# Patient Record
Sex: FEMALE | Race: WHITE | HISPANIC OR LATINO | Employment: PART TIME | URBAN - NONMETROPOLITAN AREA
[De-identification: names, ages, dates, MRNs, and addresses within clinical notes are randomized per-mention and may not be internally consistent; named-entity substitution may affect disease eponyms.]

---

## 2019-01-01 ENCOUNTER — NON-PROVIDER VISIT (OUTPATIENT)
Dept: URGENT CARE | Facility: PHYSICIAN GROUP | Age: 29
End: 2019-01-01

## 2019-01-01 DIAGNOSIS — Z11.1 ENCOUNTER FOR PPD TEST: ICD-10-CM

## 2019-01-01 PROCEDURE — 86580 TB INTRADERMAL TEST: CPT | Performed by: PHYSICIAN ASSISTANT

## 2019-01-01 NOTE — NON-PROVIDER
Georgia Nugent is a 28 y.o. female here for a non-provider visit for PPD placement -- Step 1 of 1    Reason for PPD:  work requirement    1. TB evaluation questionnaire completed by patient? Yes      -  If any answers marked yes did you contact a provider prior to placing? No  2.  Patient notified to return to clinic for reading on: 1/3/19 or 1/4/19  3.  PPD Placement documentation completed on TB evaluation questionnaire? Yes  4.  Location of TB evaluation questionnaire filed: [facility]

## 2019-01-03 ENCOUNTER — NON-PROVIDER VISIT (OUTPATIENT)
Dept: URGENT CARE | Facility: PHYSICIAN GROUP | Age: 29
End: 2019-01-03

## 2019-01-03 LAB — TB WHEAL 3D P 5 TU DIAM: NEGATIVE MM

## 2019-08-01 ENCOUNTER — NON-PROVIDER VISIT (OUTPATIENT)
Dept: URGENT CARE | Facility: PHYSICIAN GROUP | Age: 29
End: 2019-08-01

## 2019-08-01 DIAGNOSIS — Z02.1 PRE-EMPLOYMENT DRUG SCREENING: ICD-10-CM

## 2019-08-01 LAB
AMP AMPHETAMINE: NORMAL
COC COCAINE: NORMAL
INT CON NEG: NORMAL
INT CON POS: NORMAL
MET METHAMPHETAMINES: NORMAL
OPI OPIATES: NORMAL
PCP PHENCYCLIDINE: NORMAL
POC DRUG COMMENT 753798-OCCUPATIONAL HEALTH: NEGATIVE
THC: NORMAL

## 2019-08-01 PROCEDURE — 80305 DRUG TEST PRSMV DIR OPT OBS: CPT | Performed by: NURSE PRACTITIONER

## 2020-09-14 ENCOUNTER — OFFICE VISIT (OUTPATIENT)
Dept: URGENT CARE | Facility: PHYSICIAN GROUP | Age: 30
End: 2020-09-14
Payer: MEDICAID

## 2020-09-14 VITALS
HEART RATE: 89 BPM | OXYGEN SATURATION: 99 % | TEMPERATURE: 97.7 F | HEIGHT: 67 IN | RESPIRATION RATE: 16 BRPM | DIASTOLIC BLOOD PRESSURE: 92 MMHG | WEIGHT: 199 LBS | SYSTOLIC BLOOD PRESSURE: 138 MMHG | BODY MASS INDEX: 31.23 KG/M2

## 2020-09-14 DIAGNOSIS — H69.93 EUSTACHIAN TUBE DYSFUNCTION, BILATERAL: ICD-10-CM

## 2020-09-14 DIAGNOSIS — H92.02 OTALGIA OF LEFT EAR: ICD-10-CM

## 2020-09-14 PROCEDURE — 99203 OFFICE O/P NEW LOW 30 MIN: CPT | Performed by: PHYSICIAN ASSISTANT

## 2020-09-14 RX ORDER — ESCITALOPRAM OXALATE 10 MG/1
10 TABLET ORAL DAILY
COMMUNITY

## 2020-09-14 RX ORDER — ACETAMINOPHEN 500 MG
500-1000 TABLET ORAL EVERY 6 HOURS PRN
COMMUNITY
End: 2021-08-01

## 2020-09-14 ASSESSMENT — ENCOUNTER SYMPTOMS
FEVER: 0
HEADACHES: 1
SORE THROAT: 0
COUGH: 0
CHILLS: 0
SHORTNESS OF BREATH: 0
DIARRHEA: 0
ABDOMINAL PAIN: 0
PALPITATIONS: 0
EYE PAIN: 0
MYALGIAS: 0
BLURRED VISION: 0
VOMITING: 0
DIZZINESS: 1
NAUSEA: 0

## 2020-09-14 ASSESSMENT — PAIN SCALES - GENERAL: PAINLEVEL: 5=MODERATE PAIN

## 2020-09-15 NOTE — PROGRESS NOTES
Subjective:      Georgia Nugent is a 30 y.o. female who presents with Otalgia ((L) side, since friday )    HPI:  This is a new problem. Georgia Nugent is a 30 y.o. female who presents today for evaluation of left ear pain.  Patient states that she started to develop left ear pain around 9:30 PM on Friday night.  States it has been gradually improving but she has history of ear infections and has ruptured her eardrum in the past so she was concerned and wanted to come in for evaluation she has not noticed any drainage from the ear.  No recent swimming.  No pain with movement of the external ear.  No sore throat.  No tinnitus.  She notes she had some mild dizziness the first couple days but this has resolved.  She had bought some OTC meclizine to use for the dizziness but never needed to use it.  She has been taking OTC ibuprofen for the pain which has provided mild relief.      Review of Systems   Constitutional: Negative for chills, fever and malaise/fatigue.   HENT: Positive for ear pain. Negative for congestion and sore throat.    Eyes: Negative for blurred vision and pain.   Respiratory: Negative for cough and shortness of breath.    Cardiovascular: Negative for chest pain and palpitations.   Gastrointestinal: Negative for abdominal pain, diarrhea, nausea and vomiting.   Musculoskeletal: Negative for myalgias.   Neurological: Positive for dizziness and headaches.   Endo/Heme/Allergies: Positive for environmental allergies.       PMH:  has a past medical history of Anxiety, Arthritis, Depression, Psychiatric disorder, and Urinary tract infection, site not specified. She also has no past medical history of Addisons disease (formerly [facility]), Adrenal disorder (formerly [facility]), Allergy, Anemia, Arrhythmia, ASTHMA, Blood transfusion, Cancer (formerly [facility]), CATARACT, CHF (congestive heart failure) (formerly [facility]), Clotting disorder (formerly [facility]), COPD, Cushings syndrome (formerly [facility]), Diabetes, Diabetic neuropathy (formerly [facility]), EMPHYSEMA, GERD (gastroesophageal reflux disease),  "Glaucoma, Goiter, Headache(784.0), Heart attack (HCC), Heart murmur, HIV (human immunodeficiency virus infection), Hyperlipidemia, Hypertension, IBD (inflammatory bowel disease), Kidney disease, Meningitis, Migraine, Muscle disorder, OSTEOPOROSIS, Parathyroid disorder (HCC), Pituitary disease (HCC), Seizure (HCC), Sickle cell disease (HCC), Stroke (HCC), Substance abuse (HCC), Thyroid disease, Tuberculosis, or Ulcer.  MEDS:   Current Outpatient Medications:   •  escitalopram (LEXAPRO) 10 MG Tab, Take 10 mg by mouth every day., Disp: , Rfl:   •  acetaminophen (TYLENOL) 500 MG Tab, Take 500-1,000 mg by mouth every 6 hours as needed., Disp: , Rfl:   •  Prenatal MV-Min-Fe Fum-FA-DHA (PRENATAL 1 PO), Take  by mouth.  , Disp: , Rfl:   ALLERGIES: No Known Allergies  SURGHX:   Past Surgical History:   Procedure Laterality Date   • DENTAL EXTRACTION(S)  04/2012     SOCHX:  reports that she has quit smoking. Her smoking use included cigarettes. She has a 2.00 pack-year smoking history. She has never used smokeless tobacco. She reports current alcohol use. She reports that she does not use drugs.  FH: Family history was reviewed, no pertinent findings to report     Objective:     /92   Pulse 89   Temp 36.5 °C (97.7 °F) (Temporal)   Resp 16   Ht 1.702 m (5' 7\")   Wt 90.3 kg (199 lb)   SpO2 99%   BMI 31.17 kg/m²      Physical Exam  Constitutional:       Appearance: She is well-developed.   HENT:      Head: Normocephalic and atraumatic.      Right Ear: Ear canal and external ear normal. A middle ear effusion is present.      Left Ear: Ear canal and external ear normal. A middle ear effusion is present.      Ears:      Comments: Very mild fluid levels noted behind TMs bilaterally.  There is no bulging or erythema.  No mastoid tenderness.     Nose: Nose normal.      Mouth/Throat:      Lips: Pink.      Mouth: Mucous membranes are moist.      Pharynx: Oropharynx is clear.   Eyes:      Conjunctiva/sclera: Conjunctivae " normal.      Pupils: Pupils are equal, round, and reactive to light.   Neck:      Musculoskeletal: Normal range of motion.   Cardiovascular:      Rate and Rhythm: Normal rate and regular rhythm.      Heart sounds: Normal heart sounds. No murmur.   Pulmonary:      Effort: Pulmonary effort is normal.      Breath sounds: Normal breath sounds. No wheezing.   Lymphadenopathy:      Cervical: No cervical adenopathy.   Skin:     General: Skin is warm and dry.      Capillary Refill: Capillary refill takes less than 2 seconds.   Neurological:      Mental Status: She is alert and oriented to person, place, and time.   Psychiatric:         Behavior: Behavior normal.         Judgment: Judgment normal.            Assessment/Plan:        1. Otalgia of left ear    2. Eustachian tube dysfunction, bilateral      Patient reports her symptoms have been gradually improving.  No evidence of infection on exam.  There is a mild amount of fluid behind TMs bilaterally symptoms could be related to eustachian tube dysfunction.  Recommend OTC antihistamine and OTC Flonase for 7 to 10 days.  If symptoms persist or worsen she can return for reevaluation.

## 2020-12-16 ENCOUNTER — OFFICE VISIT (OUTPATIENT)
Dept: URGENT CARE | Facility: PHYSICIAN GROUP | Age: 30
End: 2020-12-16
Payer: MEDICAID

## 2020-12-16 ENCOUNTER — HOSPITAL ENCOUNTER (OUTPATIENT)
Facility: MEDICAL CENTER | Age: 30
End: 2020-12-16
Attending: PHYSICIAN ASSISTANT
Payer: MEDICAID

## 2020-12-16 VITALS
WEIGHT: 198 LBS | OXYGEN SATURATION: 98 % | SYSTOLIC BLOOD PRESSURE: 128 MMHG | RESPIRATION RATE: 16 BRPM | HEIGHT: 67 IN | HEART RATE: 112 BPM | DIASTOLIC BLOOD PRESSURE: 82 MMHG | TEMPERATURE: 97.4 F | BODY MASS INDEX: 31.08 KG/M2

## 2020-12-16 DIAGNOSIS — N30.01 ACUTE CYSTITIS WITH HEMATURIA: ICD-10-CM

## 2020-12-16 DIAGNOSIS — R30.0 DYSURIA: ICD-10-CM

## 2020-12-16 LAB
APPEARANCE UR: CLEAR
BILIRUB UR STRIP-MCNC: NORMAL MG/DL
COLOR UR AUTO: NORMAL
GLUCOSE UR STRIP.AUTO-MCNC: NORMAL MG/DL
KETONES UR STRIP.AUTO-MCNC: NORMAL MG/DL
LEUKOCYTE ESTERASE UR QL STRIP.AUTO: NORMAL
NITRITE UR QL STRIP.AUTO: NORMAL
PH UR STRIP.AUTO: 7 [PH] (ref 5–8)
PROT UR QL STRIP: NORMAL MG/DL
RBC UR QL AUTO: NORMAL
SP GR UR STRIP.AUTO: 1
UROBILINOGEN UR STRIP-MCNC: NORMAL MG/DL

## 2020-12-16 PROCEDURE — 81002 URINALYSIS NONAUTO W/O SCOPE: CPT | Performed by: PHYSICIAN ASSISTANT

## 2020-12-16 PROCEDURE — 99214 OFFICE O/P EST MOD 30 MIN: CPT | Mod: 25 | Performed by: PHYSICIAN ASSISTANT

## 2020-12-16 PROCEDURE — 87077 CULTURE AEROBIC IDENTIFY: CPT

## 2020-12-16 PROCEDURE — 87086 URINE CULTURE/COLONY COUNT: CPT

## 2020-12-16 RX ORDER — PHENAZOPYRIDINE HYDROCHLORIDE 200 MG/1
200 TABLET, FILM COATED ORAL 3 TIMES DAILY
Qty: 6 TAB | Refills: 0 | Status: SHIPPED | OUTPATIENT
Start: 2020-12-16 | End: 2020-12-18

## 2020-12-16 RX ORDER — NITROFURANTOIN 25; 75 MG/1; MG/1
100 CAPSULE ORAL EVERY 12 HOURS
Qty: 10 CAP | Refills: 0 | Status: SHIPPED | OUTPATIENT
Start: 2020-12-16 | End: 2020-12-21

## 2020-12-16 NOTE — PROGRESS NOTES
Chief Complaint   Patient presents with   • UTI       HISTORY OF PRESENT ILLNESS: Patient is a 30 y.o. female who presents today because she has a 4-day history of increased urinary urgency, frequency, dysuria.  This is similar to previous UTI symptoms.  She has not been taking any medications for her current symptoms.  Denies any fevers, chills, nausea, vomiting or diarrhea    Patient Active Problem List    Diagnosis Date Noted   • LGSIL (low grade squamous intraepithelial dysplasia) 01/29/2013   • Encounter for supervision of other normal pregnancy 01/16/2013   • Anxiety 01/16/2013       Allergies:Patient has no known allergies.    Current Outpatient Medications Ordered in Epic   Medication Sig Dispense Refill   • nitrofurantoin (MACROBID) 100 MG Cap Take 1 Cap by mouth every 12 hours for 5 days. 10 Cap 0   • phenazopyridine (PYRIDIUM) 200 MG Tab Take 1 Tab by mouth 3 times a day for 2 days. 6 Tab 0   • escitalopram (LEXAPRO) 10 MG Tab Take 10 mg by mouth every day.     • acetaminophen (TYLENOL) 500 MG Tab Take 500-1,000 mg by mouth every 6 hours as needed.     • Prenatal MV-Min-Fe Fum-FA-DHA (PRENATAL 1 PO) Take  by mouth.         No current Epic-ordered facility-administered medications on file.        Past Medical History:   Diagnosis Date   • Anxiety     at age 7   • Arthritis     diagnosed 1 yr ago   • Depression     Slight depression diagnose at age 14   • Psychiatric disorder     anxiety   • Urinary tract infection, site not specified     last had one 1 yr ago       Social History     Tobacco Use   • Smoking status: Former Smoker     Packs/day: 1.00     Years: 2.00     Pack years: 2.00     Types: Cigarettes   • Smokeless tobacco: Never Used   • Tobacco comment: last smoked 08/11/12   Substance Use Topics   • Alcohol use: Yes     Comment: Occassionllay, last had a drink 07/28/12   • Drug use: No     Comment: pain medication abuse per mother        Family Status   Relation Name Status   • Mo  Alive   • Fa   "Alive   • Bro 3 Alive   • Sis  Alive   • MGMo  Alive   • MGFa     • PGMo  Alive   • PGFa       Family History   Problem Relation Age of Onset   • Anxiety disorder Mother    • GI Disease Mother    • Heart Disease Father    • Alcohol/Drug Father    • Diabetes Brother         one of pt brother has type 2 diabetes       ROS:  Review of Systems   Constitutional: Negative for fever, chills, weight loss and malaise/fatigue.   HENT: Negative for ear pain, nosebleeds, congestion, sore throat and neck pain.    Eyes: Negative for blurred vision.   Respiratory: Negative for cough, sputum production, shortness of breath and wheezing.    Cardiovascular: Negative for chest pain, palpitations, orthopnea and leg swelling.   Gastrointestinal: Negative for heartburn, nausea, vomiting and abdominal pain.   Genitourinary: Positive for dysuria, urgency and frequency.     Exam:  /82 (BP Location: Right arm, Patient Position: Sitting, BP Cuff Size: Large adult)   Pulse (!) 112   Temp 36.3 °C (97.4 °F) (Temporal)   Resp 16   Ht 1.702 m (5' 7\")   Wt 89.8 kg (198 lb)   SpO2 98%   General:  Well nourished, well developed female in NAD  Head:Normocephalic, atraumatic  Eyes: PERRLA, EOM within normal limits, no conjunctival injection, no scleral icterus, visual fields and acuity grossly intact.  Nose: Symmetrical without tenderness, no discharge.  Mouth: reasonable hygiene, no erythema exudates or tonsillar enlargement.  Neck: no masses, range of motion within normal limits, no tracheal deviation. No obvious thyroid enlargement.  Extremities: no clubbing, cyanosis, or edema.    Urinalysis has blood and leuks.    Please note that this dictation was created using voice recognition software. I have made every reasonable attempt to correct obvious errors, but I expect that there are errors of grammar and possibly content that I did not discover before finalizing the note.    Assessment/Plan:  1. Acute cystitis with " hematuria  Urine Culture    nitrofurantoin (MACROBID) 100 MG Cap   2. Dysuria  POCT Urinalysis    phenazopyridine (PYRIDIUM) 200 MG Tab   Increase p.o. fluids.    Followup with primary care in the next 7-10 days if not significantly improving, return to the urgent care or go to the emergency room sooner for any worsening of symptoms.

## 2020-12-17 DIAGNOSIS — N30.01 ACUTE CYSTITIS WITH HEMATURIA: ICD-10-CM

## 2020-12-20 LAB
BACTERIA UR CULT: NORMAL
SIGNIFICANT IND 70042: NORMAL
SITE SITE: NORMAL
SOURCE SOURCE: NORMAL

## 2021-03-18 ENCOUNTER — NON-PROVIDER VISIT (OUTPATIENT)
Dept: URGENT CARE | Facility: PHYSICIAN GROUP | Age: 31
End: 2021-03-18

## 2021-03-18 DIAGNOSIS — Z11.1 PPD SCREENING TEST: ICD-10-CM

## 2021-03-18 PROCEDURE — 86580 TB INTRADERMAL TEST: CPT | Performed by: NURSE PRACTITIONER

## 2021-03-20 ENCOUNTER — NON-PROVIDER VISIT (OUTPATIENT)
Dept: URGENT CARE | Facility: PHYSICIAN GROUP | Age: 31
End: 2021-03-20

## 2021-03-20 DIAGNOSIS — Z11.1 PPD SCREENING TEST: ICD-10-CM

## 2021-03-20 LAB — TB WHEAL 3D P 5 TU DIAM: NEGATIVE MM

## 2021-03-20 PROCEDURE — 99999 PR NO CHARGE: CPT | Performed by: FAMILY MEDICINE

## 2021-08-01 ENCOUNTER — OFFICE VISIT (OUTPATIENT)
Dept: URGENT CARE | Facility: PHYSICIAN GROUP | Age: 31
End: 2021-08-01
Payer: MEDICAID

## 2021-08-01 ENCOUNTER — HOSPITAL ENCOUNTER (OUTPATIENT)
Facility: MEDICAL CENTER | Age: 31
End: 2021-08-01
Attending: PHYSICIAN ASSISTANT
Payer: MEDICAID

## 2021-08-01 VITALS
SYSTOLIC BLOOD PRESSURE: 136 MMHG | OXYGEN SATURATION: 100 % | TEMPERATURE: 97.8 F | HEIGHT: 66 IN | DIASTOLIC BLOOD PRESSURE: 72 MMHG | RESPIRATION RATE: 14 BRPM | BODY MASS INDEX: 33.11 KG/M2 | HEART RATE: 128 BPM | WEIGHT: 206 LBS

## 2021-08-01 DIAGNOSIS — N30.01 ACUTE CYSTITIS WITH HEMATURIA: ICD-10-CM

## 2021-08-01 DIAGNOSIS — R39.15 URINARY URGENCY: ICD-10-CM

## 2021-08-01 DIAGNOSIS — R35.0 URINARY FREQUENCY: ICD-10-CM

## 2021-08-01 LAB
APPEARANCE UR: NORMAL
BILIRUB UR STRIP-MCNC: NORMAL MG/DL
COLOR UR AUTO: YELLOW
GLUCOSE UR STRIP.AUTO-MCNC: NORMAL MG/DL
INT CON NEG: NORMAL
INT CON POS: NORMAL
KETONES UR STRIP.AUTO-MCNC: NORMAL MG/DL
LEUKOCYTE ESTERASE UR QL STRIP.AUTO: NORMAL
NITRITE UR QL STRIP.AUTO: NORMAL
PH UR STRIP.AUTO: 6.5 [PH] (ref 5–8)
POC URINE PREGNANCY TEST: NORMAL
PROT UR QL STRIP: NORMAL MG/DL
RBC UR QL AUTO: NORMAL
SP GR UR STRIP.AUTO: 1.01
UROBILINOGEN UR STRIP-MCNC: 0.2 MG/DL

## 2021-08-01 PROCEDURE — 81002 URINALYSIS NONAUTO W/O SCOPE: CPT | Performed by: PHYSICIAN ASSISTANT

## 2021-08-01 PROCEDURE — 87086 URINE CULTURE/COLONY COUNT: CPT

## 2021-08-01 PROCEDURE — 99214 OFFICE O/P EST MOD 30 MIN: CPT | Mod: 25 | Performed by: PHYSICIAN ASSISTANT

## 2021-08-01 PROCEDURE — 81025 URINE PREGNANCY TEST: CPT | Performed by: PHYSICIAN ASSISTANT

## 2021-08-01 RX ORDER — NITROFURANTOIN 25; 75 MG/1; MG/1
100 CAPSULE ORAL EVERY 12 HOURS
Qty: 10 CAPSULE | Refills: 0 | Status: SHIPPED | OUTPATIENT
Start: 2021-08-01 | End: 2021-08-06

## 2021-08-01 RX ORDER — PHENAZOPYRIDINE HYDROCHLORIDE 200 MG/1
200 TABLET, FILM COATED ORAL 3 TIMES DAILY PRN
Qty: 10 TABLET | Refills: 0 | Status: SHIPPED | OUTPATIENT
Start: 2021-08-01

## 2021-08-01 NOTE — PROGRESS NOTES
Chief Complaint   Patient presents with   • Painful Urination     x 1 week        HISTORY OF PRESENT ILLNESS: Patient is a 31 y.o. female who presents today for the following:    Dysuria x 1 week  + increased urinary frequency/urgency  Right side low back pain  H/o UTI, feels the same  Denies fever  + mild nausea    Patient Active Problem List    Diagnosis Date Noted   • LGSIL (low grade squamous intraepithelial dysplasia) 2013   • Encounter for supervision of other normal pregnancy 2013   • Anxiety 2013       Allergies:Patient has no known allergies.    Current Outpatient Medications Ordered in Epic   Medication Sig Dispense Refill   • nitrofurantoin (MACROBID) 100 MG Cap Take 1 capsule by mouth every 12 hours for 5 days. 10 capsule 0   • phenazopyridine (PYRIDIUM) 200 MG Tab Take 1 tablet by mouth 3 times a day as needed for Mild Pain or Moderate Pain. 10 tablet 0   • escitalopram (LEXAPRO) 10 MG Tab Take 10 mg by mouth every day.       No current [facility]-ordered facility-administered medications on file.       Past Medical History:   Diagnosis Date   • Anxiety     at age 7   • Arthritis     diagnosed 1 yr ago   • Depression     Slight depression diagnose at age 14   • Psychiatric disorder     anxiety   • Urinary tract infection, site not specified     last had one 1 yr ago       Social History     Tobacco Use   • Smoking status: Former Smoker     Packs/day: 1.00     Years: 2.00     Pack years: 2.00     Types: Cigarettes   • Smokeless tobacco: Never Used   • Tobacco comment: last smoked 10   Vaping Use   • Vaping Use: Never used   Substance Use Topics   • Alcohol use: Yes     Comment: Occassionllay, last had a drink 12   • Drug use: No     Comment: pain medication abuse per mother        Family Status   Relation Name Status   • Mo  Alive   • Fa  Alive   • Bro 3 Alive   • Sis  Alive   • MGMo  Alive   • MGFa     • PGMo  Alive   • PGFa       Family History   Problem Relation  "Age of Onset   • Anxiety disorder Mother    • GI Disease Mother    • Heart Disease Father    • Alcohol/Drug Father    • Diabetes Brother         one of pt brother has type 2 diabetes       Review of Systems:    Constitutional ROS: No unexpected change in weight, No weakness, No fatigue  Pulmonary ROS: No chronic cough, sputum, or hemoptysis, No dyspnea on exertion, No wheezing  Cardiovascular ROS: No diaphoresis, No edema, No palpitations  Gastrointestinal ROS: No change in bowel habits, No significant change in appetite, No nausea, vomiting, diarrhea, or constipation  Hematologic/Lymphatic ROS: No chills, No night sweats, No weight loss  Skin/Integumentary ROS: No edema, No evidence of rash, No itching    Exam:  /72   Pulse (!) 128   Temp 36.6 °C (97.8 °F)   Resp 14   Ht 1.676 m (5' 6\")   Wt 93.4 kg (206 lb)   SpO2 100%   General: Well developed, well nourished. No distress.  Pulmonary: Unlabored respiratory effort.   Back: No CVA tenderness noted.  Neurologic: Grossly nonfocal. No facial asymmetry noted.  Skin: Warm, dry, good turgor. No rashes in visible areas.   Psych: Normal mood. Alert and oriented x3. Judgment and insight is normal.    UA: Moderate blood, large leukocyte esterase, otherwise negative  HCG: Negative    Assessment/Plan:  Drink plenty of fluids. Will contact patient with culture results. Use all medication as directed. Follow up for worsening or persistent symptoms.  1. Acute cystitis with hematuria  POCT Urinalysis    Urine Culture    POCT PREGNANCY    nitrofurantoin (MACROBID) 100 MG Cap    phenazopyridine (PYRIDIUM) 200 MG Tab   2. Urinary frequency  POCT Urinalysis    Urine Culture    POCT PREGNANCY    nitrofurantoin (MACROBID) 100 MG Cap    phenazopyridine (PYRIDIUM) 200 MG Tab   3. Urinary urgency  POCT Urinalysis    Urine Culture    POCT PREGNANCY    nitrofurantoin (MACROBID) 100 MG Cap    phenazopyridine (PYRIDIUM) 200 MG Tab       "

## 2021-08-02 DIAGNOSIS — R35.0 URINARY FREQUENCY: ICD-10-CM

## 2021-08-02 DIAGNOSIS — N30.01 ACUTE CYSTITIS WITH HEMATURIA: ICD-10-CM

## 2021-08-02 DIAGNOSIS — R39.15 URINARY URGENCY: ICD-10-CM

## 2021-08-05 LAB
BACTERIA UR CULT: NORMAL
SIGNIFICANT IND 70042: NORMAL
SITE SITE: NORMAL
SOURCE SOURCE: NORMAL

## 2022-01-27 ENCOUNTER — OFFICE VISIT (OUTPATIENT)
Dept: URGENT CARE | Facility: PHYSICIAN GROUP | Age: 32
End: 2022-01-27
Payer: MEDICAID

## 2022-01-27 VITALS
TEMPERATURE: 98.4 F | OXYGEN SATURATION: 99 % | SYSTOLIC BLOOD PRESSURE: 124 MMHG | RESPIRATION RATE: 16 BRPM | WEIGHT: 192 LBS | BODY MASS INDEX: 30.13 KG/M2 | HEIGHT: 67 IN | HEART RATE: 96 BPM | DIASTOLIC BLOOD PRESSURE: 82 MMHG

## 2022-01-27 DIAGNOSIS — R06.02 SHORTNESS OF BREATH: ICD-10-CM

## 2022-01-27 DIAGNOSIS — U07.1 COVID-19 VIRUS INFECTION: ICD-10-CM

## 2022-01-27 PROCEDURE — 99213 OFFICE O/P EST LOW 20 MIN: CPT | Performed by: NURSE PRACTITIONER

## 2022-01-27 ASSESSMENT — ENCOUNTER SYMPTOMS
NAUSEA: 1
DIARRHEA: 0
SORE THROAT: 0
CHILLS: 0
WHEEZING: 0
HEMOPTYSIS: 0
MYALGIAS: 0
SPUTUM PRODUCTION: 0
CONSTIPATION: 0
VOMITING: 0
RHINORRHEA: 0
ABDOMINAL PAIN: 0
HEADACHES: 1
FEVER: 0
COUGH: 1
SHORTNESS OF BREATH: 1

## 2022-01-28 NOTE — PROGRESS NOTES
"Subjective:     Georgia Nugent is a 31 y.o. female who presents for Cough (tested positive for Covid on 1- still has cough)      Cough  This is a new problem. The current episode started 1 to 4 weeks ago (Georgia is a pleasant 31 year old female who presents to  today with complaints of cough following a COVID infection 10 days ago. She states her cough is now \"dry and itchy\".  ). The problem has been gradually improving (She states she felt heavyness in her chest as well as \"lightnighting chest pain\"). Associated symptoms include headaches and shortness of breath. Pertinent negatives include no chills, fever, hemoptysis, myalgias, nasal congestion, rhinorrhea, sore throat or wheezing. She has tried rest (Motrin) for the symptoms. There is no history of asthma, bronchitis or pneumonia.         Review of Systems   Constitutional: Positive for malaise/fatigue. Negative for chills and fever.   HENT: Negative for congestion, rhinorrhea and sore throat.    Respiratory: Positive for cough and shortness of breath. Negative for hemoptysis, sputum production and wheezing.    Gastrointestinal: Positive for nausea. Negative for abdominal pain, constipation, diarrhea and vomiting.   Musculoskeletal: Negative for myalgias.   Neurological: Positive for headaches.       PMH:   Past Medical History:   Diagnosis Date   • Anxiety     at age 7   • Arthritis     diagnosed 1 yr ago   • Depression     Slight depression diagnose at age 14   • Psychiatric disorder     anxiety   • Urinary tract infection, site not specified     last had one 1 yr ago     ALLERGIES: No Known Allergies  SURGHX:   Past Surgical History:   Procedure Laterality Date   • DENTAL EXTRACTION(S)  04/2012     SOCHX:   Social History     Socioeconomic History   • Marital status: Single     Spouse name: Not on file   • Number of children: Not on file   • Years of education: Not on file   • Highest education level: Not on file   Occupational History   • Not on " file   Tobacco Use   • Smoking status: Former Smoker     Packs/day: 1.00     Years: 2.00     Pack years: 2.00     Types: Cigarettes   • Smokeless tobacco: Never Used   • Tobacco comment: last smoked 08/11/12   Vaping Use   • Vaping Use: Never used   Substance and Sexual Activity   • Alcohol use: Not Currently     Comment: Occassionllay, last had a drink 07/28/12   • Drug use: No     Comment: pain medication abuse per mother    • Sexual activity: Yes     Partners: Male     Comment: None   Other Topics Concern   • Not on file   Social History Narrative   • Not on file     Social Determinants of Health     Financial Resource Strain:    • Difficulty of Paying Living Expenses: Not on file   Food Insecurity:    • Worried About Running Out of Food in the Last Year: Not on file   • Ran Out of Food in the Last Year: Not on file   Transportation Needs:    • Lack of Transportation (Medical): Not on file   • Lack of Transportation (Non-Medical): Not on file   Physical Activity:    • Days of Exercise per Week: Not on file   • Minutes of Exercise per Session: Not on file   Stress:    • Feeling of Stress : Not on file   Social Connections:    • Frequency of Communication with Friends and Family: Not on file   • Frequency of Social Gatherings with Friends and Family: Not on file   • Attends Yazidi Services: Not on file   • Active Member of Clubs or Organizations: Not on file   • Attends Club or Organization Meetings: Not on file   • Marital Status: Not on file   Intimate Partner Violence:    • Fear of Current or Ex-Partner: Not on file   • Emotionally Abused: Not on file   • Physically Abused: Not on file   • Sexually Abused: Not on file   Housing Stability:    • Unable to Pay for Housing in the Last Year: Not on file   • Number of Places Lived in the Last Year: Not on file   • Unstable Housing in the Last Year: Not on file     FH:   Family History   Problem Relation Age of Onset   • Anxiety disorder Mother    • GI Disease  "Mother    • Heart Disease Father    • Alcohol/Drug Father    • Diabetes Brother         one of pt brother has type 2 diabetes         Objective:   /82   Pulse 96   Temp 36.9 °C (98.4 °F) (Temporal)   Resp 16   Ht 1.702 m (5' 7\")   Wt 87.1 kg (192 lb)   SpO2 99%   BMI 30.07 kg/m²     Physical Exam  Vitals and nursing note reviewed.   Constitutional:       General: She is not in acute distress.     Appearance: Normal appearance. She is not ill-appearing.   HENT:      Head: Normocephalic and atraumatic.      Right Ear: Tympanic membrane, ear canal and external ear normal. There is no impacted cerumen.      Left Ear: Tympanic membrane, ear canal and external ear normal. There is no impacted cerumen.      Nose: No congestion or rhinorrhea.      Mouth/Throat:      Mouth: Mucous membranes are moist.      Pharynx: No oropharyngeal exudate or posterior oropharyngeal erythema.   Eyes:      Extraocular Movements: Extraocular movements intact.      Pupils: Pupils are equal, round, and reactive to light.   Cardiovascular:      Rate and Rhythm: Normal rate and regular rhythm.      Pulses: Normal pulses.      Heart sounds: Normal heart sounds.   Pulmonary:      Effort: Pulmonary effort is normal. No respiratory distress.      Breath sounds: Normal breath sounds. No stridor. No wheezing, rhonchi or rales.   Chest:      Chest wall: No tenderness.   Abdominal:      General: Abdomen is flat. Bowel sounds are normal.      Palpations: Abdomen is soft.      Tenderness: There is no abdominal tenderness. There is no right CVA tenderness or left CVA tenderness.   Musculoskeletal:         General: Normal range of motion.      Cervical back: Normal range of motion and neck supple. No tenderness.   Lymphadenopathy:      Cervical: No cervical adenopathy.   Skin:     General: Skin is warm and dry.      Capillary Refill: Capillary refill takes less than 2 seconds.   Neurological:      General: No focal deficit present.      Mental " Status: She is alert and oriented to person, place, and time. Mental status is at baseline.   Psychiatric:         Mood and Affect: Mood normal.         Behavior: Behavior normal.         Thought Content: Thought content normal.         Judgment: Judgment normal.         Assessment/Plan:   Assessment    1. COVID-19 virus infection     2. Shortness of breath       Lung sounds in the clinic were clear.  O2 saturation on room air 99%.  No concern for pneumonia or bronchitis at this time.  Her symptoms are rapidly improving however, she would like to have her lungs checked following heaviness feeling in lungs and shortness of breath.  We discussed supportive measures including humidifier, warm salt water gargles, over-the-counter Cepacol throat lozenges, rest  and increased fluids. Pt was encouraged to seek treatment back in the ER or urgent care for worsening symptoms,  fever greater than 100.5, wheezes or shortness of breath.    AVS handout given and reviewed with patient. Pt educated on red flags and when to seek treatment back in ER or UC.

## 2022-09-21 ENCOUNTER — OFFICE VISIT (OUTPATIENT)
Dept: URGENT CARE | Facility: PHYSICIAN GROUP | Age: 32
End: 2022-09-21
Payer: MEDICAID

## 2022-09-21 ENCOUNTER — HOSPITAL ENCOUNTER (OUTPATIENT)
Facility: MEDICAL CENTER | Age: 32
End: 2022-09-21
Attending: FAMILY MEDICINE
Payer: MEDICAID

## 2022-09-21 VITALS
OXYGEN SATURATION: 98 % | BODY MASS INDEX: 27.62 KG/M2 | SYSTOLIC BLOOD PRESSURE: 120 MMHG | RESPIRATION RATE: 14 BRPM | HEIGHT: 67 IN | TEMPERATURE: 98 F | HEART RATE: 101 BPM | DIASTOLIC BLOOD PRESSURE: 80 MMHG | WEIGHT: 176 LBS

## 2022-09-21 DIAGNOSIS — R30.0 DYSURIA: ICD-10-CM

## 2022-09-21 DIAGNOSIS — R35.0 FREQUENCY OF URINATION: ICD-10-CM

## 2022-09-21 DIAGNOSIS — N39.0 URINARY TRACT INFECTION WITHOUT HEMATURIA, SITE UNSPECIFIED: ICD-10-CM

## 2022-09-21 DIAGNOSIS — R35.0 URINARY FREQUENCY: ICD-10-CM

## 2022-09-21 LAB
APPEARANCE UR: CLEAR
BILIRUB UR STRIP-MCNC: NEGATIVE MG/DL
COLOR UR AUTO: YELLOW
GLUCOSE UR STRIP.AUTO-MCNC: NEGATIVE MG/DL
INT CON NEG: NEGATIVE
INT CON POS: POSITIVE
KETONES UR STRIP.AUTO-MCNC: NEGATIVE MG/DL
LEUKOCYTE ESTERASE UR QL STRIP.AUTO: NEGATIVE
NITRITE UR QL STRIP.AUTO: NEGATIVE
PH UR STRIP.AUTO: 7 [PH] (ref 5–8)
POC URINE PREGNANCY TEST: NEGATIVE
PROT UR QL STRIP: NEGATIVE MG/DL
RBC UR QL AUTO: NEGATIVE
SP GR UR STRIP.AUTO: 1.02
UROBILINOGEN UR STRIP-MCNC: 0.2 MG/DL

## 2022-09-21 PROCEDURE — 81002 URINALYSIS NONAUTO W/O SCOPE: CPT | Performed by: FAMILY MEDICINE

## 2022-09-21 PROCEDURE — 87086 URINE CULTURE/COLONY COUNT: CPT

## 2022-09-21 PROCEDURE — 81025 URINE PREGNANCY TEST: CPT | Performed by: FAMILY MEDICINE

## 2022-09-21 PROCEDURE — 99213 OFFICE O/P EST LOW 20 MIN: CPT | Mod: 25 | Performed by: FAMILY MEDICINE

## 2022-09-21 RX ORDER — NITROFURANTOIN 25; 75 MG/1; MG/1
100 CAPSULE ORAL EVERY 12 HOURS
Qty: 10 CAPSULE | Refills: 0 | Status: SHIPPED | OUTPATIENT
Start: 2022-09-21 | End: 2022-09-26

## 2022-09-21 RX ORDER — PHENAZOPYRIDINE HYDROCHLORIDE 200 MG/1
200 TABLET, FILM COATED ORAL 3 TIMES DAILY
Qty: 6 TABLET | Refills: 0 | Status: SHIPPED | OUTPATIENT
Start: 2022-09-21 | End: 2022-09-23

## 2022-09-21 ASSESSMENT — ENCOUNTER SYMPTOMS
MYALGIAS: 0
FEVER: 0
VOMITING: 0
NAUSEA: 0
FLANK PAIN: 1
SORE THROAT: 0
DIZZINESS: 0
CHILLS: 0
SHORTNESS OF BREATH: 0

## 2022-09-21 NOTE — PATIENT INSTRUCTIONS
Urinary Tract Infection, Adult  A urinary tract infection (UTI) is an infection of any part of the urinary tract. The urinary tract includes:  The kidneys.  The ureters.  The bladder.  The urethra.  These organs make, store, and get rid of pee (urine) in the body.  What are the causes?  This is caused by germs (bacteria) in your genital area. These germs grow and cause swelling (inflammation) of your urinary tract.  What increases the risk?  You are more likely to develop this condition if:  You have a small, thin tube (catheter) to drain pee.  You cannot control when you pee or poop (incontinence).  You are female, and:  You use these methods to prevent pregnancy:  A medicine that kills sperm (spermicide).  A device that blocks sperm (diaphragm).  You have low levels of a female hormone (estrogen).  You are pregnant.  You have genes that add to your risk.  You are sexually active.  You take antibiotic medicines.  You have trouble peeing because of:  A prostate that is bigger than normal, if you are male.  A blockage in the part of your body that drains pee from the bladder (urethra).  A kidney stone.  A nerve condition that affects your bladder (neurogenic bladder).  Not getting enough to drink.  Not peeing often enough.  You have other conditions, such as:  Diabetes.  A weak disease-fighting system (immune system).  Sickle cell disease.  Gout.  Injury of the spine.  What are the signs or symptoms?  Symptoms of this condition include:  Needing to pee right away (urgently).  Peeing often.  Peeing small amounts often.  Pain or burning when peeing.  Blood in the pee.  Pee that smells bad or not like normal.  Trouble peeing.  Pee that is cloudy.  Fluid coming from the vagina, if you are female.  Pain in the belly or lower back.  Other symptoms include:  Throwing up (vomiting).  No urge to eat.  Feeling mixed up (confused).  Being tired and grouchy (irritable).  A fever.  Watery poop (diarrhea).  How is this  treated?  This condition may be treated with:  Antibiotic medicine.  Other medicines.  Drinking enough water.  Follow these instructions at home:    Medicines  Take over-the-counter and prescription medicines only as told by your doctor.  If you were prescribed an antibiotic medicine, take it as told by your doctor. Do not stop taking it even if you start to feel better.  General instructions  Make sure you:  Pee until your bladder is empty.  Do not hold pee for a long time.  Empty your bladder after sex.  Wipe from front to back after pooping if you are a female. Use each tissue one time when you wipe.  Drink enough fluid to keep your pee pale yellow.  Keep all follow-up visits as told by your doctor. This is important.  Contact a doctor if:  You do not get better after 1-2 days.  Your symptoms go away and then come back.  Get help right away if:  You have very bad back pain.  You have very bad pain in your lower belly.  You have a fever.  You are sick to your stomach (nauseous).  You are throwing up.  Summary  A urinary tract infection (UTI) is an infection of any part of the urinary tract.  This condition is caused by germs in your genital area.  There are many risk factors for a UTI. These include having a small, thin tube to drain pee and not being able to control when you pee or poop.  Treatment includes antibiotic medicines for germs.  Drink enough fluid to keep your pee pale yellow.  This information is not intended to replace advice given to you by your health care provider. Make sure you discuss any questions you have with your health care provider.  Document Released: 06/05/2009 Document Revised: 12/05/2019 Document Reviewed: 06/27/2019  [employer] Patient Education © 2020 [employer]

## 2022-09-21 NOTE — PROGRESS NOTES
Subjective:   Georgia Nugent is a 32 y.o. female who presents for Urinary Frequency (X 3 days) and Flank Pain (X 3 days)        Urinary Frequency  This is a new (Reports dysuria and, urinary frequency over the past 3 days) problem. Associated symptoms include urinary symptoms. Pertinent negatives include no chills, fever, myalgias, nausea, rash, sore throat or vomiting. Associated symptoms comments: Similar symptoms with previous urinary tract infections. She has tried rest and drinking (Over-the-counter urinary tract medication) for the symptoms. The treatment provided no relief.   PMH:  has a past medical history of Anxiety, Arthritis, Depression, Psychiatric disorder, and Urinary tract infection, site not specified.    She has no past medical history of Addisons disease ([facility]), Adrenal disorder ([facility]), Allergy, Anemia, Arrhythmia, ASTHMA, Blood transfusion, Cancer ([facility]), CATARACT, CHF (congestive heart failure) ([facility]), Clotting disorder ([facility]), COPD, Cushings syndrome ([facility]), Diabetes, Diabetic neuropathy ([facility]), EMPHYSEMA, GERD (gastroesophageal reflux disease), Glaucoma, Goiter, Headache(784.0), Heart attack ([facility]), Heart murmur, HIV (human immunodeficiency virus infection), Hyperlipidemia, Hypertension, IBD (inflammatory bowel disease), Kidney disease, Meningitis, Migraine, Muscle disorder, OSTEOPOROSIS, Parathyroid disorder ([facility]), Pituitary disease ([facility]), Seizure ([facility]), Sickle cell disease ([facility]), Stroke ([facility]), Substance abuse ([facility]), Thyroid disease, Tuberculosis, or Ulcer.  MEDS:   Current Outpatient Medications:     nitrofurantoin (MACROBID) 100 MG Cap, Take 1 Capsule by mouth every 12 hours for 5 days., Disp: 10 Capsule, Rfl: 0    phenazopyridine (PYRIDIUM) 200 MG Tab, Take 1 Tablet by mouth 3 times a day for 2 days., Disp: 6 Tablet, Rfl: 0    escitalopram (LEXAPRO) 10 MG Tab, Take 10 mg by mouth every day., Disp: , Rfl:     phenazopyridine (PYRIDIUM) 200 MG Tab, Take 1 tablet by mouth 3 times a day as needed for  "Mild Pain or Moderate Pain. (Patient not taking: No sig reported), Disp: 10 tablet, Rfl: 0  ALLERGIES: No Known Allergies  SURGHX:   Past Surgical History:   Procedure Laterality Date    DENTAL EXTRACTION(S)  04/2012     SOCHX:  reports that she has quit smoking. Her smoking use included cigarettes. She has a 2.00 pack-year smoking history. She has never used smokeless tobacco. She reports that she does not currently use alcohol. She reports that she does not use drugs.  FH:   Family History   Problem Relation Age of Onset    Anxiety disorder Mother     GI Disease Mother     Heart Disease Father     Alcohol/Drug Father     Diabetes Brother         one of pt brother has type 2 diabetes     Review of Systems   Constitutional:  Negative for chills and fever.   HENT:  Negative for sore throat.    Respiratory:  Negative for shortness of breath.    Gastrointestinal:  Negative for nausea and vomiting.   Genitourinary:  Positive for dysuria, flank pain (Intermittent) and frequency.   Musculoskeletal:  Negative for myalgias.   Skin:  Negative for rash.   Neurological:  Negative for dizziness.      Objective:   /80   Pulse (!) 101   Temp 36.7 °C (98 °F) (Temporal)   Resp 14   Ht 1.702 m (5' 7\")   Wt 79.8 kg (176 lb)   SpO2 98%   BMI 27.57 kg/m²   Physical Exam  Vitals and nursing note reviewed.   Constitutional:       General: She is not in acute distress.     Appearance: She is well-developed.   HENT:      Head: Normocephalic and atraumatic.      Right Ear: External ear normal.      Left Ear: External ear normal.      Nose: Nose normal.      Mouth/Throat:      Mouth: Mucous membranes are moist.   Eyes:      Conjunctiva/sclera: Conjunctivae normal.   Cardiovascular:      Rate and Rhythm: Normal rate.   Pulmonary:      Effort: Pulmonary effort is normal. No respiratory distress.      Breath sounds: Normal breath sounds.   Abdominal:      General: There is no distension.      Tenderness: There is no abdominal " tenderness. There is no right CVA tenderness, left CVA tenderness or guarding.   Musculoskeletal:         General: Normal range of motion.   Skin:     General: Skin is warm and dry.   Neurological:      General: No focal deficit present.      Mental Status: She is alert and oriented to person, place, and time. Mental status is at baseline.      Gait: Gait (gait at baseline) normal.   Psychiatric:         Judgment: Judgment normal.         Assessment/Plan:   1. Frequency of urination  - POCT Pregnancy  - POCT Urinalysis  - Urine Culture; Future  - nitrofurantoin (MACROBID) 100 MG Cap; Take 1 Capsule by mouth every 12 hours for 5 days.  Dispense: 10 Capsule; Refill: 0  - phenazopyridine (PYRIDIUM) 200 MG Tab; Take 1 Tablet by mouth 3 times a day for 2 days.  Dispense: 6 Tablet; Refill: 0    2. Dysuria  - phenazopyridine (PYRIDIUM) 200 MG Tab; Take 1 Tablet by mouth 3 times a day for 2 days.  Dispense: 6 Tablet; Refill: 0    3. Urinary frequency  - Urine Culture; Future  - nitrofurantoin (MACROBID) 100 MG Cap; Take 1 Capsule by mouth every 12 hours for 5 days.  Dispense: 10 Capsule; Refill: 0  - phenazopyridine (PYRIDIUM) 200 MG Tab; Take 1 Tablet by mouth 3 times a day for 2 days.  Dispense: 6 Tablet; Refill: 0    4. Urinary tract infection without hematuria, site unspecified  - Urine Culture; Future  - nitrofurantoin (MACROBID) 100 MG Cap; Take 1 Capsule by mouth every 12 hours for 5 days.  Dispense: 10 Capsule; Refill: 0  - phenazopyridine (PYRIDIUM) 200 MG Tab; Take 1 Tablet by mouth 3 times a day for 2 days.  Dispense: 6 Tablet; Refill: 0        Medical Decision Making/Course:  In the course of preparing for this visit with review of the pertinent past medical history, recent and past clinic visits, current medications, and performing chart, immunization, medical history and medication reconciliation, and in the further course of obtaining the current history pertinent to the clinic visit today, performing an exam  and evaluation, ordering and independently evaluating labs, tests point-of-care urinalysis, point-of-care urine pregnancy, urine culture, and/or procedures, prescribing any recommended new medications as noted above, providing any pertinent counseling and education and recommending further coordination of care, at least  14 minutes of total time were spent during this encounter.      Discussed close monitoring, return precautions, and supportive measures of maintaining adequate fluid hydration and caloric intake, relative rest and symptom management as needed for pain and/or fever.    Differential diagnosis, natural history, supportive care, and indications for immediate follow-up discussed.     Advised the patient to follow-up with the primary care physician for recheck, reevaluation, and consideration of further management.    Please note that this dictation was created using voice recognition software. I have worked with consultants from the vendor as well as technical experts from [employer] [employer] to optimize the interface. I have made every reasonable attempt to correct obvious errors, but I expect that there are errors of grammar and possibly content that I did not discover before finalizing the note.

## 2022-09-22 DIAGNOSIS — R35.0 FREQUENCY OF URINATION: ICD-10-CM

## 2022-09-22 DIAGNOSIS — N39.0 URINARY TRACT INFECTION WITHOUT HEMATURIA, SITE UNSPECIFIED: ICD-10-CM

## 2022-09-22 DIAGNOSIS — R35.0 URINARY FREQUENCY: ICD-10-CM

## 2022-09-25 LAB
BACTERIA UR CULT: NORMAL
SIGNIFICANT IND 70042: NORMAL
SITE SITE: NORMAL
SOURCE SOURCE: NORMAL

## 2023-02-25 ENCOUNTER — NON-PROVIDER VISIT (OUTPATIENT)
Dept: URGENT CARE | Facility: PHYSICIAN GROUP | Age: 33
End: 2023-02-25

## 2023-02-25 DIAGNOSIS — Z02.83 ENCOUNTER FOR DRUG SCREENING: ICD-10-CM

## 2023-02-25 PROCEDURE — 8907 PR URINE COLLECT ONLY: Performed by: NURSE PRACTITIONER

## 2023-08-07 ENCOUNTER — OFFICE VISIT (OUTPATIENT)
Dept: URGENT CARE | Facility: PHYSICIAN GROUP | Age: 33
End: 2023-08-07
Payer: MEDICAID

## 2023-08-07 VITALS
RESPIRATION RATE: 16 BRPM | TEMPERATURE: 98.8 F | HEIGHT: 67 IN | WEIGHT: 183 LBS | OXYGEN SATURATION: 98 % | BODY MASS INDEX: 28.72 KG/M2 | HEART RATE: 103 BPM | DIASTOLIC BLOOD PRESSURE: 76 MMHG | SYSTOLIC BLOOD PRESSURE: 114 MMHG

## 2023-08-07 DIAGNOSIS — K04.7 DENTAL INFECTION: ICD-10-CM

## 2023-08-07 PROCEDURE — 99213 OFFICE O/P EST LOW 20 MIN: CPT | Performed by: NURSE PRACTITIONER

## 2023-08-07 PROCEDURE — 3078F DIAST BP <80 MM HG: CPT | Performed by: NURSE PRACTITIONER

## 2023-08-07 PROCEDURE — 3074F SYST BP LT 130 MM HG: CPT | Performed by: NURSE PRACTITIONER

## 2023-08-07 RX ORDER — CHLORHEXIDINE GLUCONATE ORAL RINSE 1.2 MG/ML
5 SOLUTION DENTAL 2 TIMES DAILY
Qty: 118 ML | Refills: 0 | Status: SHIPPED | OUTPATIENT
Start: 2023-08-07 | End: 2023-08-14

## 2023-08-07 RX ORDER — AMOXICILLIN 875 MG/1
875 TABLET, COATED ORAL 2 TIMES DAILY
Qty: 14 TABLET | Refills: 0 | Status: SHIPPED | OUTPATIENT
Start: 2023-08-07 | End: 2023-08-14

## 2023-08-07 NOTE — LETTER
August 7, 2023    To Whom It May Concern:         This is confirmation that Georgia Luisdugo attended her scheduled appointment with MARIOLA Champion on 8/07/23.         If you have any questions please do not hesitate to call me at the phone number listed below.    Sincerely,          BORA Champion.  [phone]

## 2023-08-08 ASSESSMENT — ENCOUNTER SYMPTOMS
DIARRHEA: 0
VOMITING: 0
NAUSEA: 0
FEVER: 0
CHILLS: 1
ABDOMINAL PAIN: 0

## 2023-08-08 NOTE — PROGRESS NOTES
Subjective:     Georgia Nugent is a 33 y.o. female who presents for Dental Pain (X 3 days on top L with L ear pain, fever and chills. )      Dental Pain   Pertinent negatives include no fever.     Pt presents for evaluation of a new problem. Georgia is a pleasant 33 year old female who presents to  today with complaints of left upper tooth pain. She is scheduled for a root canal this week. Her pain has suddenly worsened. Her pain is now radiating to her left jaw and ear. She has been using Tylenol for pain.    Review of Systems   Constitutional:  Positive for chills. Negative for fever.   Gastrointestinal:  Negative for abdominal pain, diarrhea, nausea and vomiting.       PMH:   Past Medical History:   Diagnosis Date    Anxiety     at age 7    Arthritis     diagnosed 1 yr ago    Depression     Slight depression diagnose at age 14    Psychiatric disorder     anxiety    Urinary tract infection, site not specified     last had one 1 yr ago     ALLERGIES: No Known Allergies  SURGHX:   Past Surgical History:   Procedure Laterality Date    DENTAL EXTRACTION(S)  04/2012     SOCHX:   Social History     Socioeconomic History    Marital status: Single   Tobacco Use    Smoking status: Former     Packs/day: 1.00     Years: 2.00     Pack years: 2.00     Types: Cigarettes    Smokeless tobacco: Never    Tobacco comments:     last smoked 08/11/12   Vaping Use    Vaping Use: Never used   Substance and Sexual Activity    Alcohol use: Not Currently     Comment: Occassionllay, last had a drink 07/28/12    Drug use: No     Comment: pain medication abuse per mother     Sexual activity: Yes     Partners: Male     Comment: None     FH:   Family History   Problem Relation Age of Onset    Anxiety disorder Mother     GI Disease Mother     Heart Disease Father     Alcohol/Drug Father     Diabetes Brother         one of pt brother has type 2 diabetes         Objective:   /76   Pulse (!) 103   Temp 37.1 °C (98.8 °F) (Temporal)   Resp  "16   Ht 1.702 m (5' 7\")   Wt 83 kg (183 lb)   LMP 07/18/2023   SpO2 98%   BMI 28.66 kg/m²     Physical Exam  Vitals and nursing note reviewed.   Constitutional:       General: She is not in acute distress.     Appearance: Normal appearance. She is normal weight. She is not ill-appearing or toxic-appearing.   HENT:      Head: Normocephalic.      Right Ear: External ear normal.      Left Ear: External ear normal.      Nose: No congestion or rhinorrhea.      Mouth/Throat:      Dentition: Abnormal dentition. Dental tenderness, gingival swelling and dental caries present. No dental abscesses.      Pharynx: No oropharyngeal exudate or posterior oropharyngeal erythema.     Eyes:      General:         Right eye: No discharge.         Left eye: No discharge.      Pupils: Pupils are equal, round, and reactive to light.   Pulmonary:      Effort: Pulmonary effort is normal.   Abdominal:      General: Abdomen is flat.   Musculoskeletal:         General: Normal range of motion.      Cervical back: Normal range of motion and neck supple.   Skin:     General: Skin is dry.   Neurological:      General: No focal deficit present.      Mental Status: She is alert and oriented to person, place, and time. Mental status is at baseline.   Psychiatric:         Mood and Affect: Mood normal.         Behavior: Behavior normal.         Thought Content: Thought content normal.         Judgment: Judgment normal.         Assessment/Plan:   Assessment      1. Dental infection  amoxicillin (AMOXIL) 875 MG tablet    chlorhexidine (PERIDEX) 0.12 % Solution        Pt to start amoxicillin for infection of left upper tooth. Swish and spit with Peridex following meals. Pt instructed to use Tylenol 500 mg every four hours or Ibuprofen 600 mg every six hours as needed for relief of fever/pain.    "

## 2024-07-05 ENCOUNTER — NON-PROVIDER VISIT (OUTPATIENT)
Dept: URGENT CARE | Facility: PHYSICIAN GROUP | Age: 34
End: 2024-07-05

## 2024-07-05 DIAGNOSIS — Z02.83 ENCOUNTER FOR DRUG SCREENING: ICD-10-CM

## 2024-07-05 PROCEDURE — 8279 PR URINE 6 PANEL - SEND TO LAB: Performed by: NURSE PRACTITIONER

## 2024-08-22 ENCOUNTER — OFFICE VISIT (OUTPATIENT)
Dept: URGENT CARE | Facility: PHYSICIAN GROUP | Age: 34
End: 2024-08-22
Payer: MEDICAID

## 2024-08-22 VITALS
HEART RATE: 90 BPM | OXYGEN SATURATION: 97 % | TEMPERATURE: 97.5 F | WEIGHT: 188 LBS | DIASTOLIC BLOOD PRESSURE: 98 MMHG | BODY MASS INDEX: 29.44 KG/M2 | SYSTOLIC BLOOD PRESSURE: 152 MMHG | RESPIRATION RATE: 16 BRPM

## 2024-08-22 DIAGNOSIS — R50.9 FEVER, UNSPECIFIED FEVER CAUSE: ICD-10-CM

## 2024-08-22 DIAGNOSIS — B34.9 VIRAL ILLNESS: ICD-10-CM

## 2024-08-22 DIAGNOSIS — R11.0 NAUSEA: ICD-10-CM

## 2024-08-22 LAB
FLUAV RNA SPEC QL NAA+PROBE: NEGATIVE
FLUBV RNA SPEC QL NAA+PROBE: NEGATIVE
RSV RNA SPEC QL NAA+PROBE: NEGATIVE
S PYO DNA SPEC NAA+PROBE: NOT DETECTED
SARS-COV-2 RNA RESP QL NAA+PROBE: NEGATIVE

## 2024-08-22 PROCEDURE — 3080F DIAST BP >= 90 MM HG: CPT | Performed by: NURSE PRACTITIONER

## 2024-08-22 PROCEDURE — 87637 SARSCOV2&INF A&B&RSV AMP PRB: CPT | Mod: QW | Performed by: NURSE PRACTITIONER

## 2024-08-22 PROCEDURE — 99213 OFFICE O/P EST LOW 20 MIN: CPT | Performed by: NURSE PRACTITIONER

## 2024-08-22 PROCEDURE — 3077F SYST BP >= 140 MM HG: CPT | Performed by: NURSE PRACTITIONER

## 2024-08-22 PROCEDURE — 87651 STREP A DNA AMP PROBE: CPT | Performed by: NURSE PRACTITIONER

## 2024-08-22 RX ORDER — ONDANSETRON 4 MG/1
TABLET, ORALLY DISINTEGRATING ORAL
Qty: 20 TABLET | Refills: 0 | Status: SHIPPED | OUTPATIENT
Start: 2024-08-22

## 2024-08-22 NOTE — LETTER
[facility] [place]  [address]  [facility] [address]     August 22, 2024    Patient: Georgia Nugent   YOB: 1990   Date of Visit: 8/22/2024       To Whom It May Concern:    Georgia Nugent was seen and treated in our department on 8/22/2024.  Patient had negative COVID, influenza, RSV, and strep testing.    Sincerely,     BORA King.

## 2024-08-22 NOTE — PROGRESS NOTES
Subjective:   Georgia Nugent is a 34 y.o. female who presents for Fever (X3 days Fever, body aches, throat feel bruised when swallowing, nausea, fatigue )    Patient is a 34-year-old female presents clinic today reporting 3-day history of fevers, body aches, sore throat (feeling like her throat is swollen or like something stuck) , nausea, and fatigue.  She denies any cough, shortness of breath, headaches, or nasal congestion.  She has been taking over-the-counter Motrin which has helped some with symptoms.  No known sick contact however patient does work with the [employer] services.    Medications, Allergies, and current problem list reviewed today in Epic.     Objective:     BP (!) 152/98   Pulse 90   Temp 36.4 °C (97.5 °F) (Temporal)   Resp 16   Wt 85.3 kg (188 lb)   SpO2 97%     Physical Exam  Vitals reviewed.   Constitutional:       General: She is not in acute distress.     Appearance: Normal appearance. She is ill-appearing. She is not toxic-appearing.   HENT:      Head: Normocephalic.      Right Ear: Tympanic membrane, ear canal and external ear normal.      Left Ear: Tympanic membrane, ear canal and external ear normal.      Nose: Nose normal. No rhinorrhea.      Mouth/Throat:      Lips: Pink. No lesions.      Mouth: Mucous membranes are moist.      Palate: No mass.      Pharynx: Oropharynx is clear. No pharyngeal swelling, posterior oropharyngeal erythema or uvula swelling.   Eyes:      Extraocular Movements: Extraocular movements intact.      Conjunctiva/sclera: Conjunctivae normal.      Pupils: Pupils are equal, round, and reactive to light.   Cardiovascular:      Rate and Rhythm: Normal rate and regular rhythm.   Pulmonary:      Effort: Pulmonary effort is normal. No respiratory distress.      Breath sounds: Normal breath sounds. No stridor. No wheezing, rhonchi or rales.   Abdominal:      General: Abdomen is flat. Bowel sounds are normal. There is no distension.      Palpations: Abdomen  is soft.      Tenderness: There is no abdominal tenderness. There is no guarding or rebound.   Musculoskeletal:         General: Normal range of motion.      Cervical back: Normal range of motion and neck supple.   Lymphadenopathy:      Cervical: No cervical adenopathy.   Skin:     General: Skin is warm and dry.   Neurological:      Mental Status: She is alert and oriented to person, place, and time.   Psychiatric:         Mood and Affect: Mood normal.         Behavior: Behavior normal.         Thought Content: Thought content normal.         Judgment: Judgment normal.       Results for orders placed or performed in visit on 08/22/24   POCT Cepheid Group A Strep - PCR   Result Value Ref Range    POC Group A Strep, PCR Not Detected Not Detected, Invalid   POCT Cepheid CoV-2, Flu A/B, RSV - PCR   Result Value Ref Range    SARS-CoV-2 by PCR Negative Negative, Invalid    Influenza virus A RNA Negative Negative, Invalid    Influenza virus B, PCR Negative Negative, Invalid    RSV, PCR Negative Negative, Invalid       Assessment/Plan:     Diagnosis and associated orders:     1. Viral illness        2. Fever, unspecified fever cause  POCT Cepheid Group A Strep - PCR    POCT Cepheid CoV-2, Flu A/B, RSV - PCR      3. Nausea  ondansetron (ZOFRAN ODT) 4 MG TABLET DISPERSIBLE         Comments/MDM:     POCT COVID, influenza, RSV all negative.  Patient presents clinic today with symptoms of viral illness.  No signs of secondary bacterial infection.  Lung sounds are clear on physical exam.  No signs of bacterial pharyngitis.  Discussed with patient findings, answered all questions.  Recommended Tylenol, Motrin, and warm salt water gargles to help with pharyngitis.  Zofran was prescribed to help with nausea.  Recommended bland diet and increase as tolerated.  Follow back up in clinic if symptoms or not improving in the next 3 to 5 days or sooner if symptoms acutely worsen or fever arises.  Patient was involved with shared  decision-making throughout the exam today and verbalizes understanding regards to plan of care, discharge instructions, and follow-up         Differential diagnosis, natural history, supportive care, and indications for immediate follow-up discussed.    Advised the patient to follow-up with the primary care physician for recheck, reevaluation, and consideration of further management.    I personally reviewed prior external notes and test results pertinent to today's visit as well as additional imaging and testing completed in clinic today.     Please note that this dictation was created using voice recognition software. I have made a reasonable attempt to correct obvious errors, but I expect that there are errors of grammar and possibly content that I did not discover before finalizing the note.

## 2025-03-18 ENCOUNTER — ANCILLARY PROCEDURE (OUTPATIENT)
Dept: MATERNAL FETAL MEDICINE | Facility: MEDICAL CENTER | Age: 35
End: 2025-03-18
Payer: COMMERCIAL

## 2025-03-18 DIAGNOSIS — Z31.89 ENCOUNTER FOR ARTIFICIAL INSEMINATION: ICD-10-CM

## 2025-03-18 DIAGNOSIS — R03.0 ELEVATED BLOOD PRESSURE READING: ICD-10-CM

## 2025-03-18 DIAGNOSIS — O09.522 AMA (ADVANCED MATERNAL AGE) MULTIGRAVIDA 35+, SECOND TRIMESTER: ICD-10-CM

## 2025-03-18 DIAGNOSIS — O35.BXX0 FETAL CARDIAC ANOMALY COMPLICATING PREGNANCY, ANTEPARTUM, NOT APPLICABLE OR UNSPECIFIED FETUS: ICD-10-CM

## 2025-03-18 DIAGNOSIS — Z3A.20 20 WEEKS GESTATION OF PREGNANCY: ICD-10-CM

## 2025-03-18 LAB
APPEARANCE UR: CLEAR
BILIRUB UR STRIP-MCNC: NEGATIVE MG/DL
COLOR UR AUTO: YELLOW
GLUCOSE UR STRIP.AUTO-MCNC: NEGATIVE MG/DL
KETONES UR STRIP.AUTO-MCNC: NEGATIVE MG/DL
LEUKOCYTE ESTERASE UR QL STRIP.AUTO: NORMAL
NITRITE UR QL STRIP.AUTO: NEGATIVE
PH UR STRIP.AUTO: 7 [PH] (ref 5–8)
PROT UR QL STRIP: NEGATIVE MG/DL
RBC UR QL AUTO: NEGATIVE
SP GR UR STRIP.AUTO: 1.01
UROBILINOGEN UR STRIP-MCNC: 0.2 MG/DL

## 2025-06-13 ENCOUNTER — OFFICE VISIT (OUTPATIENT)
Dept: URGENT CARE | Facility: PHYSICIAN GROUP | Age: 35
End: 2025-06-13
Payer: COMMERCIAL

## 2025-06-13 ENCOUNTER — RESULTS FOLLOW-UP (OUTPATIENT)
Dept: URGENT CARE | Facility: PHYSICIAN GROUP | Age: 35
End: 2025-06-13

## 2025-06-13 VITALS
BODY MASS INDEX: 31.65 KG/M2 | DIASTOLIC BLOOD PRESSURE: 90 MMHG | TEMPERATURE: 97.6 F | RESPIRATION RATE: 16 BRPM | WEIGHT: 202.1 LBS | SYSTOLIC BLOOD PRESSURE: 142 MMHG | HEART RATE: 86 BPM | OXYGEN SATURATION: 97 %

## 2025-06-13 DIAGNOSIS — J02.9 SORE THROAT: ICD-10-CM

## 2025-06-13 DIAGNOSIS — Z3A.32 PREGNANCY WITH 32 COMPLETED WEEKS GESTATION: ICD-10-CM

## 2025-06-13 DIAGNOSIS — U07.1 COVID-19: ICD-10-CM

## 2025-06-13 LAB
FLUAV RNA SPEC QL NAA+PROBE: NEGATIVE
FLUBV RNA SPEC QL NAA+PROBE: NEGATIVE
RSV RNA SPEC QL NAA+PROBE: NEGATIVE
S PYO DNA SPEC NAA+PROBE: NOT DETECTED
SARS-COV-2 RNA RESP QL NAA+PROBE: POSITIVE

## 2025-06-13 PROCEDURE — 99213 OFFICE O/P EST LOW 20 MIN: CPT | Performed by: NURSE PRACTITIONER

## 2025-06-13 PROCEDURE — 87651 STREP A DNA AMP PROBE: CPT | Performed by: NURSE PRACTITIONER

## 2025-06-13 PROCEDURE — 3080F DIAST BP >= 90 MM HG: CPT | Performed by: NURSE PRACTITIONER

## 2025-06-13 PROCEDURE — 0241U POCT CEPHEID COV-2, FLU A/B, RSV - PCR: CPT | Performed by: NURSE PRACTITIONER

## 2025-06-13 PROCEDURE — 3077F SYST BP >= 140 MM HG: CPT | Performed by: NURSE PRACTITIONER

## 2025-06-13 RX ORDER — HYDROXYZINE PAMOATE 25 MG/1
CAPSULE ORAL
COMMUNITY
Start: 2025-06-11

## 2025-06-13 ASSESSMENT — ENCOUNTER SYMPTOMS
WEAKNESS: 0
GASTROINTESTINAL NEGATIVE: 1
SENSORY CHANGE: 0
DIZZINESS: 1
HEADACHES: 1
MYALGIAS: 1
RESPIRATORY NEGATIVE: 1
COUGH: 0
SINUS PAIN: 0
SHORTNESS OF BREATH: 0
SORE THROAT: 1

## 2025-06-13 ASSESSMENT — VISUAL ACUITY: OU: 1

## 2025-06-13 NOTE — PROGRESS NOTES
Subjective:     Georgia Nugent is a 34 y.o. female who presents for Sore Throat (Poss exposure to covid. Nasal pain when breathing in. Headache. Congested head. Woke up today. )       URI   This is a new problem. The current episode started yesterday. The problem has been gradually worsening. Associated symptoms include congestion (Nasal pain, burning sensation), headaches and a sore throat (Mild). Pertinent negatives include no coughing or sinus pain.     Pregnant 8 months.    Review of Systems   Constitutional:  Positive for malaise/fatigue.   HENT:  Positive for congestion (Nasal pain, burning sensation) and sore throat (Mild). Negative for sinus pain.    Respiratory: Negative.  Negative for cough and shortness of breath.    Gastrointestinal: Negative.    Genitourinary: Negative.    Musculoskeletal:  Positive for myalgias (Bilateral upper trunk).   Neurological:  Positive for dizziness and headaches. Negative for sensory change and weakness.   All other systems reviewed and are negative.    Refer to HPI for additional details.    During this visit, appropriate PPE was worn, and hand hygiene was performed.    PMH:  has a past medical history of Anxiety, Arthritis, Depression, Psychiatric disorder, and Urinary tract infection, site not specified.    She has no past medical history of Addisons disease ([facility]), Adrenal disorder ([facility]), Allergy, Anemia, Arrhythmia, ASTHMA, Blood transfusion, Cancer ([facility]), CATARACT, CHF (congestive heart failure) ([facility]), Clotting disorder ([facility]), COPD, Cushings syndrome ([facility]), Diabetes, Diabetic neuropathy ([facility]), EMPHYSEMA, GERD (gastroesophageal reflux disease), Glaucoma, Goiter, Headache(784.0), Heart attack ([facility]), Heart murmur, HIV (human immunodeficiency virus infection), Hyperlipidemia, Hypertension, IBD (inflammatory bowel disease), Kidney disease, Meningitis, Migraine, Muscle disorder, OSTEOPOROSIS, Parathyroid disorder ([facility]), Pituitary disease ([facility]), Seizure ([facility]), Sickle cell  disease (H[facility]), Stroke (HCC), Substance abuse (HCC), Thyroid disease, Tuberculosis, or Ulcer.    MEDS: Current Medications[1]    ALLERGIES: Allergies[2]  SURGHX: Past Surgical History[3]  SOCHX:  reports that she has quit smoking. Her smoking use included cigarettes. She has a 2 pack-year smoking history. She has never used smokeless tobacco. She reports that she does not currently use alcohol. She reports that she does not use drugs.    FH: Per HPI as applicable/pertinent.      Objective:     BP (!) 142/90   Pulse 86   Temp 36.4 °C (97.6 °F) (Temporal)   Resp 16   Wt 91.7 kg (202 lb 1.6 oz)   SpO2 97%   BMI 31.65 kg/m²     Physical Exam  Nursing note reviewed.   Constitutional:       General: She is not in acute distress.     Appearance: She is well-developed. She is not ill-appearing or toxic-appearing.   HENT:      Right Ear: Tympanic membrane normal.      Left Ear: Tympanic membrane normal.      Nose: Mucosal edema and congestion present.      Right Nostril: No epistaxis, septal hematoma or occlusion.      Left Nostril: No epistaxis, septal hematoma or occlusion.      Right Turbinates: Enlarged.      Left Turbinates: Enlarged.      Right Sinus: No maxillary sinus tenderness or frontal sinus tenderness.      Left Sinus: No maxillary sinus tenderness or frontal sinus tenderness.   Eyes:      General: Vision grossly intact.      Extraocular Movements: Extraocular movements intact.   Neck:      Trachea: Phonation normal.   Cardiovascular:      Rate and Rhythm: Normal rate.   Pulmonary:      Effort: Pulmonary effort is normal. No respiratory distress.   Musculoskeletal:         General: No deformity. Normal range of motion.      Cervical back: Neck supple.   Skin:     General: Skin is warm and dry.      Coloration: Skin is not pale.   Neurological:      Mental Status: She is alert and oriented to person, place, and time.      Motor: No weakness.   Psychiatric:         Behavior: Behavior normal. Behavior is  "cooperative.     POCT Cepheid Group A Strep by PCR: negative    POCT Cepheid CoV-2, Flu A/B, RSV by PCR: positive Covid      Assessment/Plan:     1. COVID-19  - POCT Cepheid CoV-2, Flu A/B, RSV - PCR  - Nirmatrelvir&Ritonavir 300/100 20 x 150 MG & 10 x 100MG Tablet Therapy Pack; Take 300 mg nirmatrelvir (two 150 mg tablets) with 100 mg ritonavir (one 100 mg tablet) by mouth, with all three tablets taken together twice daily for 5 days.  Dispense: 30 Each; Refill: 0    2. Sore throat  - POCT CEPHEID GROUP A STREP - PCR    3. Pregnancy with 32 completed weeks gestation  - Nirmatrelvir&Ritonavir 300/100 20 x 150 MG & 10 x 100MG Tablet Therapy Pack; Take 300 mg nirmatrelvir (two 150 mg tablets) with 100 mg ritonavir (one 100 mg tablet) by mouth, with all three tablets taken together twice daily for 5 days.  Dispense: 30 Each; Refill: 0    Results released to [employer] with the following message:    \"Hello. Positive Covid. All respiratory viral illnesses are treated similarly. Focus on supportive measures, rest, fluids, and symptom management with over-the-counter medication as needed such as acetaminophen (Tylenol) and fluticasone nasal spray (Flonase). Because you are pregnant, risks of complications associated with COVID-19 are higher. There is an antiviral medication called Paxlovid that can help. I have sent a prescription to [facility] in [place] for your consideration. Be sure to get a consultation with the pharmacist. Monitor. Most people get over a viral upper respiratory infection in 7-10 days. Return for re-evaluation if not better by then, or sooner if symptoms change/worsen. Take care!\"     Work note provided.    Discharge summary provided via [employer].    Monitor. Warning signs reviewed. Immediate/return precautions advised.     Differential diagnosis, natural history, supportive care, over-the-counter symptom management per 's instructions, close monitoring, and indications for immediate follow-up " discussed.     All questions answered. Patient agrees with the plan of care.         [1]   Current Outpatient Medications:     hydrOXYzine pamoate (VISTARIL) 25 MG Cap, , Disp: , Rfl:     Nirmatrelvir&Ritonavir 300/100 20 x 150 MG & 10 x 100MG Tablet Therapy Pack, Take 300 mg nirmatrelvir (two 150 mg tablets) with 100 mg ritonavir (one 100 mg tablet) by mouth, with all three tablets taken together twice daily for 5 days., Disp: 30 Each, Rfl: 0    escitalopram (LEXAPRO) 10 MG Tab, Take 10 mg by mouth every day., Disp: , Rfl:     ondansetron (ZOFRAN ODT) 4 MG TABLET DISPERSIBLE, 1 TAB, ALLOW TO DISINTEGRATE IN MOUTH, EVERY 6 HOURS ONLY IF NEEDED FOR NAUSEA OR VOMITING. (Patient not taking: Reported on 6/13/2025), Disp: 20 Tablet, Rfl: 0  [2] No Known Allergies  [3]   Past Surgical History:  Procedure Laterality Date    DENTAL EXTRACTION(S)  04/2012

## 2025-06-13 NOTE — LETTER
June 13, 2025         Patient: Georgia Nugent   YOB: 1990   Date of Visit: 6/13/2025           To Whom it May Concern:    Georgia Nugent was seen in my clinic on 6/13/2025 due to illness. Due to medical necessity, please excuse patient from work 6/13/2025 as well as for the next 3 days as needed.     If you have any questions or concerns, please don't hesitate to call.        Sincerely,         BORA Terrazas.  Electronically Signed

## 2025-06-19 ENCOUNTER — ANCILLARY PROCEDURE (OUTPATIENT)
Dept: MATERNAL FETAL MEDICINE | Facility: MEDICAL CENTER | Age: 35
End: 2025-06-19
Attending: OBSTETRICS & GYNECOLOGY
Payer: COMMERCIAL

## 2025-06-19 VITALS
WEIGHT: 204.2 LBS | DIASTOLIC BLOOD PRESSURE: 90 MMHG | HEART RATE: 85 BPM | BODY MASS INDEX: 31.98 KG/M2 | SYSTOLIC BLOOD PRESSURE: 138 MMHG

## 2025-06-19 DIAGNOSIS — O09.522 AMA (ADVANCED MATERNAL AGE) MULTIGRAVIDA 35+, SECOND TRIMESTER: ICD-10-CM

## 2025-06-19 PROCEDURE — 76816 OB US FOLLOW-UP PER FETUS: CPT | Performed by: OBSTETRICS & GYNECOLOGY

## 2025-08-17 ENCOUNTER — APPOINTMENT (OUTPATIENT)
Dept: URGENT CARE | Facility: PHYSICIAN GROUP | Age: 35
End: 2025-08-17
Payer: COMMERCIAL